# Patient Record
(demographics unavailable — no encounter records)

---

## 2025-05-22 NOTE — HISTORY OF PRESENT ILLNESS
[FreeTextEntry1] : 60yo M hx of DM on empagliflozin presents to discuss urgency and difficulty controlling urination He start jardiance 25mg 5-6 months and and noticed the urination issues 4-5 months.  He was started on dutasteride and flomax about 3 months ago. He denies much changes with urination

## 2025-05-22 NOTE — ASSESSMENT
[FreeTextEntry1] : LUTS: c/w dutasteride and flomax for now check psa, UA will try to switch Jardiance to Januvia to see if urination improves advised to reach out to pcp or endocrinologist to manage DM  if urgency persists, will try low dose OAB meds

## 2025-06-19 NOTE — ASSESSMENT
[FreeTextEntry1] : LUTS: c/w dutasteride and flomax for now c/w Januvia will recheck HbA1c in [facility] in 2 months

## 2025-06-19 NOTE — HISTORY OF PRESENT ILLNESS
[FreeTextEntry1] : 60yo M hx of DM presents for f/u We switched jardiance to Januvia last visit and his frequency improved.